# Patient Record
Sex: MALE | Race: BLACK OR AFRICAN AMERICAN | Employment: UNEMPLOYED | URBAN - METROPOLITAN AREA
[De-identification: names, ages, dates, MRNs, and addresses within clinical notes are randomized per-mention and may not be internally consistent; named-entity substitution may affect disease eponyms.]

---

## 2018-06-19 ENCOUNTER — TELEPHONE (OUTPATIENT)
Dept: INTERNAL MEDICINE CLINIC | Age: 43
End: 2018-06-19

## 2018-07-23 ENCOUNTER — OFFICE VISIT (OUTPATIENT)
Dept: RHEUMATOLOGY | Age: 43
End: 2018-07-23

## 2018-07-23 VITALS
WEIGHT: 315 LBS | BODY MASS INDEX: 40.43 KG/M2 | HEIGHT: 74 IN | DIASTOLIC BLOOD PRESSURE: 98 MMHG | SYSTOLIC BLOOD PRESSURE: 160 MMHG | HEART RATE: 87 BPM

## 2018-07-23 DIAGNOSIS — M1A.09X0 IDIOPATHIC CHRONIC GOUT OF MULTIPLE SITES WITHOUT TOPHUS: ICD-10-CM

## 2018-07-23 DIAGNOSIS — M25.50 POLYARTHRALGIA: ICD-10-CM

## 2018-07-23 DIAGNOSIS — M1A.09X0 IDIOPATHIC CHRONIC GOUT OF MULTIPLE SITES WITHOUT TOPHUS: Primary | ICD-10-CM

## 2018-07-23 LAB
A/G RATIO: 1.5 (ref 1.1–2.2)
ALBUMIN SERPL-MCNC: 4 G/DL (ref 3.4–5)
ALP BLD-CCNC: 77 U/L (ref 40–129)
ALT SERPL-CCNC: 20 U/L (ref 10–40)
ANION GAP SERPL CALCULATED.3IONS-SCNC: 15 MMOL/L (ref 3–16)
AST SERPL-CCNC: 12 U/L (ref 15–37)
ATYPICAL LYMPHOCYTE RELATIVE PERCENT: 1 % (ref 0–6)
BANDED NEUTROPHILS RELATIVE PERCENT: 1 % (ref 0–7)
BASOPHILS ABSOLUTE: 0 K/UL (ref 0–0.2)
BASOPHILS RELATIVE PERCENT: 0 %
BILIRUB SERPL-MCNC: 0.3 MG/DL (ref 0–1)
BUN BLDV-MCNC: 23 MG/DL (ref 7–20)
C-REACTIVE PROTEIN: 11 MG/L (ref 0–5.1)
CALCIUM SERPL-MCNC: 9.3 MG/DL (ref 8.3–10.6)
CHLORIDE BLD-SCNC: 109 MMOL/L (ref 99–110)
CO2: 23 MMOL/L (ref 21–32)
CREAT SERPL-MCNC: 1 MG/DL (ref 0.9–1.3)
EOSINOPHILS ABSOLUTE: 0 K/UL (ref 0–0.6)
EOSINOPHILS RELATIVE PERCENT: 0 %
GFR AFRICAN AMERICAN: >60
GFR NON-AFRICAN AMERICAN: >60
GLOBULIN: 2.6 G/DL
GLUCOSE BLD-MCNC: 129 MG/DL (ref 70–99)
HCT VFR BLD CALC: 48.6 % (ref 40.5–52.5)
HEMOGLOBIN: 15.2 G/DL (ref 13.5–17.5)
HEPATITIS B CORE IGM ANTIBODY: NORMAL
HEPATITIS B SURFACE ANTIGEN INTERPRETATION: NORMAL
HEPATITIS C ANTIBODY INTERPRETATION: NORMAL
LYMPHOCYTES ABSOLUTE: 1.9 K/UL (ref 1–5.1)
LYMPHOCYTES RELATIVE PERCENT: 10 %
MCH RBC QN AUTO: 28.8 PG (ref 26–34)
MCHC RBC AUTO-ENTMCNC: 31.3 G/DL (ref 31–36)
MCV RBC AUTO: 91.8 FL (ref 80–100)
MONOCYTES ABSOLUTE: 0.4 K/UL (ref 0–1.3)
MONOCYTES RELATIVE PERCENT: 2 %
NEUTROPHILS ABSOLUTE: 15.4 K/UL (ref 1.7–7.7)
NEUTROPHILS RELATIVE PERCENT: 86 %
PDW BLD-RTO: 16.1 % (ref 12.4–15.4)
PLATELET # BLD: 293 K/UL (ref 135–450)
PMV BLD AUTO: 9.2 FL (ref 5–10.5)
POTASSIUM SERPL-SCNC: 4.4 MMOL/L (ref 3.5–5.1)
RBC # BLD: 5.29 M/UL (ref 4.2–5.9)
RHEUMATOID FACTOR: 11 IU/ML
SEDIMENTATION RATE, ERYTHROCYTE: 10 MM/HR (ref 0–15)
SODIUM BLD-SCNC: 147 MMOL/L (ref 136–145)
TOTAL PROTEIN: 6.6 G/DL (ref 6.4–8.2)
URIC ACID, SERUM: 11.9 MG/DL (ref 3.5–7.2)
WBC # BLD: 17.7 K/UL (ref 4–11)

## 2018-07-23 PROCEDURE — 99244 OFF/OP CNSLTJ NEW/EST MOD 40: CPT | Performed by: INTERNAL MEDICINE

## 2018-07-23 PROCEDURE — G8427 DOCREV CUR MEDS BY ELIG CLIN: HCPCS | Performed by: INTERNAL MEDICINE

## 2018-07-23 PROCEDURE — G8417 CALC BMI ABV UP PARAM F/U: HCPCS | Performed by: INTERNAL MEDICINE

## 2018-07-23 RX ORDER — PREDNISONE 10 MG/1
TABLET ORAL
Qty: 20 TABLET | Refills: 1 | Status: SHIPPED | OUTPATIENT
Start: 2018-07-23 | End: 2018-08-05

## 2018-07-23 NOTE — PROGRESS NOTES
2018  Patient Name: Shay Cowan  : 1975  Medical Record: [id number]    MEDICATIONS  Current Outpatient Prescriptions   Medication Sig Dispense Refill    predniSONE (DELTASONE) 10 MG tablet Take 4  tabs daily for 2 days, then 3 tabs daily for 2 days, then 2 tabs daily for 2 days, then 1 tab daily for 2 days and then stop 20 tablet 1    hydrALAZINE (APRESOLINE) 50 MG tablet Take 50 mg by mouth 2 times daily      metoprolol (TOPROL-XL) 100 MG XL tablet Take 100 mg by mouth daily      furosemide (LASIX) 20 MG tablet Take 40 mg by mouth daily        No current facility-administered medications for this visit. ALLERGIES  No Known Allergies      Comments  No specialty comments available. REFERRING PHYSICIAN: Dimas Melendez CNP    HISTORY OF PRESENT ILLNESS  Shay Cowan is a 43 y.o. male who is being seen for follow up evaluation of Joint pain. He was diagnosed with gout 3 years ago. His gout flareups started 3 years ago involving multiple joints. He usually gets flareups once or twice a month. He describes his flareups as red, swollen, tender, painful joint associated with erythema and warmth. He has been to the ER multiple times and has received steroid injections and prednisone taper with improvement in symptoms. He was prescribed allopurinol and stayed on it for 1 year but did not notice any improvement. He continues to have joint pain in between the flareups associated with swelling and stiffness. Back and knees are worse. He has morning stiffness lasting for few hours. His noticed improvement in joint pain on steroids. He has tried multiple NSAIDs without improvement. He also has back pain involving the lower back. Back pain gets worse with activity and improves with rest.  He has stiffness in the back.   He denies radiation, tingling or numbness of lower extremity weakness.  -He denies history of psoriasis, inflammatory bowel disease, uveitis, dactylitis, education: N/A     Occupational History    Not on file.      Social History Main Topics    Smoking status: Current Some Day Smoker     Packs/day: 0.10    Smokeless tobacco: Current User      Comment: 1 pk/wk    Alcohol use Yes      Comment: occasionally    Drug use: No    Sexual activity: Yes     Other Topics Concern    Not on file     Social History Narrative    No narrative on file     Family History   Problem Relation Age of Onset    Arthritis Mother     Diabetes Mother     Heart Disease Mother     Heart Disease Father     Arthritis Sister     Heart Disease Sister     Diabetes Sister     Heart Disease Maternal Grandmother     Diabetes Maternal Grandmother     Heart Attack Maternal Grandmother     Arthritis Maternal Grandmother     Heart Disease Paternal Grandfather     Heart Attack Paternal Grandfather     Arthritis Paternal Grandfather          PHYSICAL EXAM   Vitals:    07/23/18 0907 07/23/18 0912   BP: (!) 160/108 (!) 160/98   Site: Right Arm Right Arm   Position: Sitting Sitting   Cuff Size: Medium Adult Medium Adult   Pulse:  87   Weight: (!) 503 lb (228.2 kg)    Height: 6' 2\" (1.88 m)      Physical Exam  Constitutional:  Well developed, well nourished, no acute distress, non-toxic appearance   Musculoskeletal:  RIGHT  Swell  Tender  ROM  LEFT  Swell  Tender  ROM    DIP2  0  0  FULL   0  0  FULL    DIP3  0  0  FULL   0  0  FULL    DIP4  0  0  FULL   0  0  FULL    DIP5  0  0  FULL   0  0  FULL    PIP1  0  0  FULL   0  0  FULL    PIP2  0  0  FULL   0  0  FULL    PIP3  0  0  FULL   0  0  FULL    PIP4  0  0  FULL   0  0  FULL    PIP5  0  0  FULL   0  0  FULL    MCP1  0  0  FULL   0  0  FULL    MCP2  0  0  FULL   0 + FULL    MCP3  0  0  FULL   0  0  FULL    MCP4  0  0  FULL   0  0  FULL    MCP5  0  0  FULL   0  0  FULL    Wrist  0  0  FULL   0  0  FULL    Elbow  0  0  FULL   0  0  FULL    Shouldr  0  0  FULL   0  0  FULL    Hip  0  0  FULL   0  0  FULL    Knee  0  0  Crepitus++  0  0 Crepitus++   Ankle  0  0  FULL   0  0  FULL    MTP1  0  0  FULL   0  0  FULL    MTP2  0  0  FULL   0  0  FULL    MTP3  0  0  FULL   0  0  FULL    MTP4  0  0  FULL   0  0  FULL    MTP5  0  0  FULL   0  0  FULL    IP1  0  0  FULL   0  0  FULL    IP2  0  0  FULL   0  0  FULL    IP3  0  0  FULL   0  0  FULL    IP4  0  0  FULL   0  0  FULL    IP5  0  0  FULL   0 0 FULL       Puffiness in bilateral 2 and 3 MCP joints  Ambulates without assistance, normal gait  Neck: Full ROM, no tenderness, supple   Back- no tenderness. Eyes:  PERRL, extra ocular movements intact, conjunctiva normal   HEENT:  Atraumatic, normocephalic, external ears normal, oropharynx moist, no pharyngeal exudates. Respiratory:  No respiratory distress  GI:  Soft, nondistended, normal bowel sounds, nontender, no organomegaly, no mass, no rebound, no guarding   :  No costovertebral angle tenderness   Integument:  Well hydrated, no rash or telangiectasias  Lymphatic:  No lymphadenopathy noted   Neurologic:   Alert & oriented x 3, CN 2-12 normal, no focal deficits noted. Sensations Intact. Muscle strength 5/5 proximally and distally in upper and lower extremities.    Psychiatric:  Speech and behavior appropriate   Extremities: 2+ edema up to the mid legs        LABS AND IMAGING  Outside data reviewed and in HPI    Lab Results   Component Value Date    WBC 14.9 07/18/2014    RBC 5.29 07/18/2014    HGB 15.1 07/18/2014    HCT 47.5 07/18/2014     07/18/2014    MCV 89.8 07/18/2014    MCH 28.6 07/18/2014    MCHC 31.8 07/18/2014    RDW 15.0 07/18/2014    LYMPHOPCT 22.7 07/18/2014    MONOPCT 7.9 07/18/2014    BASOPCT 0.3 07/18/2014    MONOSABS 1.2 07/18/2014    LYMPHSABS 3.4 07/18/2014    EOSABS 0.3 07/18/2014    BASOSABS 0.0 07/18/2014       Chemistry        Component Value Date/Time     07/18/2014 1830    K 3.8 07/18/2014 1830     07/18/2014 1830    CO2 27 07/18/2014 1830    BUN 11 07/18/2014 1830    CREATININE 1.0 07/18/2014 1830

## 2018-07-25 LAB — CCP IGG ANTIBODIES: 2 UNITS (ref 0–19)

## 2018-08-30 ENCOUNTER — TELEPHONE (OUTPATIENT)
Dept: RHEUMATOLOGY | Age: 43
End: 2018-08-30

## 2018-08-30 ENCOUNTER — OFFICE VISIT (OUTPATIENT)
Dept: RHEUMATOLOGY | Age: 43
End: 2018-08-30

## 2018-08-30 VITALS
HEIGHT: 73 IN | HEART RATE: 96 BPM | BODY MASS INDEX: 41.75 KG/M2 | SYSTOLIC BLOOD PRESSURE: 145 MMHG | DIASTOLIC BLOOD PRESSURE: 105 MMHG | WEIGHT: 315 LBS

## 2018-08-30 DIAGNOSIS — M1A.09X0 IDIOPATHIC CHRONIC GOUT OF MULTIPLE SITES WITHOUT TOPHUS: Primary | ICD-10-CM

## 2018-08-30 PROCEDURE — 4004F PT TOBACCO SCREEN RCVD TLK: CPT | Performed by: INTERNAL MEDICINE

## 2018-08-30 PROCEDURE — 99214 OFFICE O/P EST MOD 30 MIN: CPT | Performed by: INTERNAL MEDICINE

## 2018-08-30 PROCEDURE — G8417 CALC BMI ABV UP PARAM F/U: HCPCS | Performed by: INTERNAL MEDICINE

## 2018-08-30 PROCEDURE — G8427 DOCREV CUR MEDS BY ELIG CLIN: HCPCS | Performed by: INTERNAL MEDICINE

## 2018-08-30 RX ORDER — COLCHICINE 0.6 MG/1
0.6 TABLET ORAL 2 TIMES DAILY
Qty: 60 TABLET | Refills: 3 | Status: SHIPPED | OUTPATIENT
Start: 2018-08-30 | End: 2019-02-27 | Stop reason: SDUPTHER

## 2018-08-30 RX ORDER — FEBUXOSTAT 40 MG/1
40 TABLET, FILM COATED ORAL DAILY
Qty: 30 TABLET | Refills: 3 | Status: SHIPPED | OUTPATIENT
Start: 2018-08-30 | End: 2018-10-09 | Stop reason: SDUPTHER

## 2018-08-30 RX ORDER — PREDNISONE 10 MG/1
TABLET ORAL
Qty: 20 TABLET | Refills: 1 | Status: SHIPPED | OUTPATIENT
Start: 2018-08-30 | End: 2018-10-09 | Stop reason: SDUPTHER

## 2018-08-30 NOTE — TELEPHONE ENCOUNTER
PA submitted for ULORIC 40 MG OR TABS  And Cochicine 0.6 mg Tabs on On license of [facility]  KEY [id number] & Key: [id number]    Pending review

## 2018-08-30 NOTE — PROGRESS NOTES
2018  Patient Name: Yuliet Bethea  : 1975  Medical Record: [id number]    MEDICATIONS  Current Outpatient Prescriptions   Medication Sig Dispense Refill    febuxostat (ULORIC) 40 MG TABS tablet Take 1 tablet by mouth daily 30 tablet 3    colchicine (COLCRYS) 0.6 MG tablet Take 1 tablet by mouth 2 times daily 60 tablet 3    predniSONE (DELTASONE) 10 MG tablet Take 4  tabs daily for 2 days, then 3 tabs daily for 2 days, then 2 tabs daily for 2 days, then 1 tab daily for 2 days and then stop 20 tablet 1    hydrALAZINE (APRESOLINE) 50 MG tablet Take 50 mg by mouth 2 times daily      metoprolol (TOPROL-XL) 100 MG XL tablet Take 100 mg by mouth daily      furosemide (LASIX) 20 MG tablet Take 40 mg by mouth daily        No current facility-administered medications for this visit. ALLERGIES  No Known Allergies      Comments  No specialty comments available. Background history:  Yuliet Bethea is a 43 y.o. male who is being seen for follow up evaluation of Joint pain. He was diagnosed with gout 3 years ago. His gout flareups started 3 years ago involving multiple joints. He usually gets flareups once or twice a month. He describes his flareups as red, swollen, tender, painful joint associated with erythema and warmth. He has been to the ER multiple times and has received steroid injections and prednisone taper with improvement in symptoms. He was prescribed allopurinol and stayed on it for 1 year but did not notice any improvement. He continues to have joint pain in between the flareups associated with swelling and stiffness. Back and knees are worse. He has morning stiffness lasting for few hours. His noticed improvement in joint pain on steroids. He has tried multiple NSAIDs without improvement. He also has back pain involving the lower back. Back pain gets worse with activity and improves with rest.  He has stiffness in the back.   He denies radiation, tingling or numbness of lower osteoporosis  Allergic/Immunologic: No nasal congestion or hives. I have reviewed patients Past medical History, Social History and Family History as mentioned in her chart and this remains unchanged from previous. Past Medical History:   Diagnosis Date    Arthritis     CHF (congestive heart failure) (HCC)     Gout     HTN (hypertension)     Hypertension      No past surgical history on file. Social History     Social History    Marital status:      Spouse name: N/A    Number of children: N/A    Years of education: N/A     Occupational History    Not on file.      Social History Main Topics    Smoking status: Current Some Day Smoker     Packs/day: 0.10    Smokeless tobacco: Current User      Comment: 1 pk/wk    Alcohol use Yes      Comment: occasionally    Drug use: No    Sexual activity: Yes     Other Topics Concern    Not on file     Social History Narrative    No narrative on file     Family History   Problem Relation Age of Onset    Arthritis Mother     Diabetes Mother     Heart Disease Mother     Heart Disease Father     Arthritis Sister     Heart Disease Sister     Diabetes Sister     Heart Disease Maternal Grandmother     Diabetes Maternal Grandmother     Heart Attack Maternal Grandmother     Arthritis Maternal Grandmother     Heart Disease Paternal Grandfather     Heart Attack Paternal Grandfather     Arthritis Paternal Grandfather          PHYSICAL EXAM   Vitals:    08/30/18 1245   BP: (!) 145/105   Site: Left Arm   Position: Sitting   Cuff Size: Large Adult   Pulse: 96   Weight: (!) 500 lb (226.8 kg)   Height: 6' 1\" (1.854 m)     Physical Exam  Constitutional:  Well developed, well nourished, no acute distress, non-toxic appearance   Musculoskeletal:  RIGHT  Swell  Tender  ROM  LEFT  Swell  Tender  ROM    DIP2  0  0  FULL   0  0  FULL    DIP3  0  0  FULL   0  0  FULL    DIP4  0  0  FULL   0  0  FULL    DIP5  0  0  FULL   0  0  FULL    PIP1  0  0  FULL   0  0 FULL    PIP2  0  0  FULL   0  0  FULL    PIP3  0  0  FULL   0  0  FULL    PIP4  0  0  FULL   0  0  FULL    PIP5  0  0  FULL   0  0  FULL    MCP1  0  0  FULL   0  0  FULL    MCP2  0  0  FULL   0 + FULL    MCP3  0  0  FULL   0  0  FULL    MCP4  0  0  FULL   0  0  FULL    MCP5  0  0  FULL   0  0  FULL    Wrist  0  0  FULL   0  0  FULL    Elbow  0  0  FULL   0  0  FULL    Shouldr  0  0  FULL   0  0  FULL    Hip  0  0  FULL   0  0  FULL    Knee  0  + Crepitus++  0  0  Crepitus++   Ankle  0  0  FULL   0  0  FULL    MTP1  0  0  FULL   0  0  FULL    MTP2  0  0  FULL   0  0  FULL    MTP3  0  0  FULL   0  0  FULL    MTP4  0  0  FULL   0  0  FULL    MTP5  0  0  FULL   0  0  FULL    IP1  0  0  FULL   0  0  FULL    IP2  0  0  FULL   0  0  FULL    IP3  0  0  FULL   0  0  FULL    IP4  0  0  FULL   0  0  FULL    IP5  0  0  FULL   0 0 FULL       Puffiness in bilateral 2 and 3 MCP joints  Ambulates without assistance, normal gait  Neck: Full ROM, no tenderness, supple   Back- no tenderness. Eyes:  PERRL, extra ocular movements intact, conjunctiva normal   HEENT:  Atraumatic, normocephalic, external ears normal, oropharynx moist, no pharyngeal exudates. Respiratory:  No respiratory distress  GI:  Soft, nondistended, normal bowel sounds, nontender, no organomegaly, no mass, no rebound, no guarding   :  No costovertebral angle tenderness   Integument:  Well hydrated, no rash or telangiectasias  Lymphatic:  No lymphadenopathy noted   Neurologic:   Alert & oriented x 3, CN 2-12 normal, no focal deficits noted. Sensations Intact. Muscle strength 5/5 proximally and distally in upper and lower extremities.    Psychiatric:  Speech and behavior appropriate   Extremities: 2+ edema up to the mid legs        LABS AND IMAGING  Outside data reviewed and in HPI    Lab Results   Component Value Date    WBC 17.7 07/23/2018    RBC 5.29 07/23/2018    HGB 15.2 07/23/2018    HCT 48.6 07/23/2018     07/23/2018    MCV 91.8 07/23/2018    MCH 28.8 07/23/2018    MCHC 31.3 07/23/2018    RDW 16.1 07/23/2018    BANDSPCT 1 07/23/2018    LYMPHOPCT 10.0 07/23/2018    MONOPCT 2.0 07/23/2018    BASOPCT 0.0 07/23/2018    MONOSABS 0.4 07/23/2018    LYMPHSABS 1.9 07/23/2018    EOSABS 0.0 07/23/2018    BASOSABS 0.0 07/23/2018       Chemistry        Component Value Date/Time     (H) 07/23/2018 1006    K 4.4 07/23/2018 1006     07/23/2018 1006    CO2 23 07/23/2018 1006    BUN 23 (H) 07/23/2018 1006    CREATININE 1.0 07/23/2018 1006        Component Value Date/Time    CALCIUM 9.3 07/23/2018 1006    ALKPHOS 77 07/23/2018 1006    AST 12 (L) 07/23/2018 1006    ALT 20 07/23/2018 1006    BILITOT 0.3 07/23/2018 1006          Lab Results   Component Value Date    SEDRATE 10 07/23/2018     Lab Results   Component Value Date    CRP 11.0 (H) 07/23/2018     No results found for: TEE, TETO, SSA, SSB, C3, C4  Lab Results   Component Value Date    RF 11.0 07/23/2018    CCPABIGG 2 07/23/2018     No results found for: TEE, ANATITER, ANAINT, PATH  No results found for: DSDNAG, DSDNAIGGIFA  No results found for: SSAROAB, SSALAAB  No results found for: SMAB, RNPAB  No results found for: CENTABIGG  No results found for: C3, C4, ACE  No results found for: JO1, VITD25, [id number]  Lab Results   Component Value Date    LABURIC 11.9 (H) 07/23/2018       ASSESSMENT AND PLAN      Assessment/Plan:      ASSESSMENT:    1. Idiopathic chronic gout of multiple sites without tophus        PLAN:   1. Idiopathic chronic gout of multiple sites without tophus  The nature of gout is fully explained, including dietary relationship, acute and interval phase and treatment of both. Long term complications such as kidney stones, tophi and arthritis are discussed. Avoidance of alcohol recommended, and written literature is given along with a low purine diet. Indications for the use of allopurinol for prophylaxis and the use of colchicine to prevent or treat flare-ups is also discussed.  Proper use of indomethacin for acute attacks discussed, and its side effects. Call if further attacks occur, or this one does not resolve promptly.  -Last uric acid 11.9 on 7/23/2018  -No improvement with allopurinol  -I will start uloric 40 mg daily and colchicine 0.6 m twice a day  -Prednisone taper for flareups  -Purine restricted diet  - febuxostat (ULORIC) 40 MG TABS tablet; Take 1 tablet by mouth daily  Dispense: 30 tablet; Refill: 3  - colchicine (COLCRYS) 0.6 MG tablet; Take 1 tablet by mouth 2 times daily  Dispense: 60 tablet; Refill: 3  - predniSONE (DELTASONE) 10 MG tablet; Take 4  tabs daily for 2 days, then 3 tabs daily for 2 days, then 2 tabs daily for 2 days, then 1 tab daily for 2 days and then stop  Dispense: 20 tablet; Refill: 1     The patient indicates understanding of these issues and agrees with the plan. Return in about 6 weeks (around 10/11/2018). The risks and benefits of my recommendations, as well as other treatment options, benefits and side effects were discussed with the patient. All questions were answered. ######################################################################    I thank you for giving me the opportunity to participate in [facility]. If you have any questions or concerns please feel free to contact me. I look forward to following  Liliana Walton along with you. Electronically signed by: Kathie Foster MD, 8/30/2018 1:05 PM    Documentation was done using voice recognition dragon software. Every effort was made to ensure accuracy; however, inadvertent unintentional computerized transcription errors may be present.

## 2018-08-31 NOTE — TELEPHONE ENCOUNTER
[employer] closed this PA so I had to renew it. New KEY [id number]    Check back for additional questions.

## 2018-10-09 ENCOUNTER — HOSPITAL ENCOUNTER (OUTPATIENT)
Age: 43
Discharge: HOME | End: 2018-10-09
Payer: MEDICAID

## 2018-10-09 ENCOUNTER — HOSPITAL ENCOUNTER (EMERGENCY)
Age: 43
Discharge: HOME | End: 2018-10-09
Payer: MEDICAID

## 2018-10-09 ENCOUNTER — HOSPITAL ENCOUNTER (OUTPATIENT)
Dept: GENERAL RADIOLOGY | Age: 43
Discharge: HOME | End: 2018-10-09
Payer: MEDICAID

## 2018-10-09 ENCOUNTER — OFFICE VISIT (OUTPATIENT)
Dept: RHEUMATOLOGY | Age: 43
End: 2018-10-09
Payer: MEDICAID

## 2018-10-09 VITALS
TEMPERATURE: 98 F | HEART RATE: 96 BPM | HEIGHT: 73 IN | DIASTOLIC BLOOD PRESSURE: 98 MMHG | WEIGHT: 315 LBS | SYSTOLIC BLOOD PRESSURE: 147 MMHG | BODY MASS INDEX: 41.75 KG/M2

## 2018-10-09 VITALS
OXYGEN SATURATION: 94 % | DIASTOLIC BLOOD PRESSURE: 87 MMHG | HEIGHT: 73 IN | RESPIRATION RATE: 20 BRPM | TEMPERATURE: 97 F | HEART RATE: 90 BPM | SYSTOLIC BLOOD PRESSURE: 118 MMHG | BODY MASS INDEX: 41.75 KG/M2 | WEIGHT: 315 LBS

## 2018-10-09 DIAGNOSIS — M1A.09X0 IDIOPATHIC CHRONIC GOUT OF MULTIPLE SITES WITHOUT TOPHUS: ICD-10-CM

## 2018-10-09 DIAGNOSIS — R74.8 ELEVATED LIVER ENZYMES: Primary | ICD-10-CM

## 2018-10-09 DIAGNOSIS — H10.13 ALLERGIC CONJUNCTIVITIS OF BOTH EYES: Primary | ICD-10-CM

## 2018-10-09 DIAGNOSIS — M17.0 PRIMARY OSTEOARTHRITIS OF BOTH KNEES: Primary | ICD-10-CM

## 2018-10-09 DIAGNOSIS — M17.0 PRIMARY OSTEOARTHRITIS OF BOTH KNEES: ICD-10-CM

## 2018-10-09 LAB
A/G RATIO: 1.7 (ref 1.1–2.2)
ALBUMIN SERPL-MCNC: 4.3 G/DL (ref 3.4–5)
ALP BLD-CCNC: 72 U/L (ref 40–129)
ALT SERPL-CCNC: 50 U/L (ref 10–40)
ANION GAP SERPL CALCULATED.3IONS-SCNC: 14 MMOL/L (ref 3–16)
AST SERPL-CCNC: 40 U/L (ref 15–37)
BASOPHILS ABSOLUTE: 0 K/UL (ref 0–0.2)
BASOPHILS RELATIVE PERCENT: 0.3 %
BILIRUB SERPL-MCNC: 0.5 MG/DL (ref 0–1)
BUN BLDV-MCNC: 11 MG/DL (ref 7–20)
CALCIUM SERPL-MCNC: 10 MG/DL (ref 8.3–10.6)
CHLORIDE BLD-SCNC: 106 MMOL/L (ref 99–110)
CO2: 25 MMOL/L (ref 21–32)
CREAT SERPL-MCNC: 1.1 MG/DL (ref 0.9–1.3)
EOSINOPHILS ABSOLUTE: 0.2 K/UL (ref 0–0.6)
EOSINOPHILS RELATIVE PERCENT: 2 %
GFR AFRICAN AMERICAN: >60
GFR NON-AFRICAN AMERICAN: >60
GLOBULIN: 2.5 G/DL
GLUCOSE BLD-MCNC: 108 MG/DL (ref 70–99)
HCT VFR BLD CALC: 54.3 % (ref 40.5–52.5)
HEMOGLOBIN: 17.2 G/DL (ref 13.5–17.5)
LYMPHOCYTES ABSOLUTE: 2.5 K/UL (ref 1–5.1)
LYMPHOCYTES RELATIVE PERCENT: 21.6 %
MCH RBC QN AUTO: 29.1 PG (ref 26–34)
MCHC RBC AUTO-ENTMCNC: 31.6 G/DL (ref 31–36)
MCV RBC AUTO: 92.1 FL (ref 80–100)
MONOCYTES ABSOLUTE: 1.2 K/UL (ref 0–1.3)
MONOCYTES RELATIVE PERCENT: 10.9 %
NEUTROPHILS ABSOLUTE: 7.5 K/UL (ref 1.7–7.7)
NEUTROPHILS RELATIVE PERCENT: 65.2 %
PDW BLD-RTO: 16.4 % (ref 12.4–15.4)
PLATELET # BLD: 252 K/UL (ref 135–450)
PMV BLD AUTO: 10.5 FL (ref 5–10.5)
POTASSIUM SERPL-SCNC: 4 MMOL/L (ref 3.5–5.1)
RBC # BLD: 5.89 M/UL (ref 4.2–5.9)
SODIUM BLD-SCNC: 145 MMOL/L (ref 136–145)
TOTAL PROTEIN: 6.8 G/DL (ref 6.4–8.2)
URIC ACID, SERUM: 9.2 MG/DL (ref 3.5–7.2)
WBC # BLD: 11.4 K/UL (ref 4–11)

## 2018-10-09 PROCEDURE — 99214 OFFICE O/P EST MOD 30 MIN: CPT | Performed by: INTERNAL MEDICINE

## 2018-10-09 PROCEDURE — G8427 DOCREV CUR MEDS BY ELIG CLIN: HCPCS | Performed by: INTERNAL MEDICINE

## 2018-10-09 PROCEDURE — G8417 CALC BMI ABV UP PARAM F/U: HCPCS | Performed by: INTERNAL MEDICINE

## 2018-10-09 PROCEDURE — 73560 X-RAY EXAM OF KNEE 1 OR 2: CPT

## 2018-10-09 PROCEDURE — G8484 FLU IMMUNIZE NO ADMIN: HCPCS | Performed by: INTERNAL MEDICINE

## 2018-10-09 PROCEDURE — 20610 DRAIN/INJ JOINT/BURSA W/O US: CPT | Performed by: INTERNAL MEDICINE

## 2018-10-09 PROCEDURE — 4004F PT TOBACCO SCREEN RCVD TLK: CPT | Performed by: INTERNAL MEDICINE

## 2018-10-09 PROCEDURE — 99282 EMERGENCY DEPT VISIT SF MDM: CPT

## 2018-10-09 RX ORDER — TRIAMCINOLONE ACETONIDE 40 MG/ML
80 INJECTION, SUSPENSION INTRA-ARTICULAR; INTRAMUSCULAR ONCE
Status: COMPLETED | OUTPATIENT
Start: 2018-10-09 | End: 2018-10-09

## 2018-10-09 RX ORDER — NAPROXEN 500 MG/1
500 TABLET ORAL 2 TIMES DAILY WITH MEALS
Qty: 60 TABLET | Refills: 3 | Status: SHIPPED | OUTPATIENT
Start: 2018-10-09 | End: 2019-02-27 | Stop reason: SDUPTHER

## 2018-10-09 RX ORDER — LIDOCAINE HYDROCHLORIDE 10 MG/ML
2 INJECTION, SOLUTION INFILTRATION; PERINEURAL ONCE
Status: COMPLETED | OUTPATIENT
Start: 2018-10-09 | End: 2018-10-09

## 2018-10-09 RX ORDER — PREDNISONE 10 MG/1
TABLET ORAL
Qty: 20 TABLET | Refills: 0 | Status: SHIPPED | OUTPATIENT
Start: 2018-10-09 | End: 2019-01-31 | Stop reason: SDUPTHER

## 2018-10-09 RX ORDER — TRIAMCINOLONE ACETONIDE 40 MG/ML
40 INJECTION, SUSPENSION INTRA-ARTICULAR; INTRAMUSCULAR ONCE
Status: CANCELLED | OUTPATIENT
Start: 2018-10-09 | End: 2018-10-09

## 2018-10-09 RX ORDER — OLOPATADINE HYDROCHLORIDE 1 MG/ML
1 SOLUTION/ DROPS OPHTHALMIC 2 TIMES DAILY
Qty: 1 BOTTLE | Refills: 0 | Status: SHIPPED | OUTPATIENT
Start: 2018-10-09 | End: 2018-10-14

## 2018-10-09 RX ORDER — FEBUXOSTAT 80 MG/1
80 TABLET, FILM COATED ORAL DAILY
Qty: 30 TABLET | Refills: 5 | Status: SHIPPED | OUTPATIENT
Start: 2018-10-09 | End: 2019-02-04 | Stop reason: SDUPTHER

## 2018-10-09 RX ADMIN — LIDOCAINE HYDROCHLORIDE 2 ML: 10 INJECTION, SOLUTION INFILTRATION; PERINEURAL at 17:23

## 2018-10-09 RX ADMIN — TRIAMCINOLONE ACETONIDE 80 MG: 40 INJECTION, SUSPENSION INTRA-ARTICULAR; INTRAMUSCULAR at 17:24

## 2018-10-09 RX ADMIN — LIDOCAINE HYDROCHLORIDE 2 ML: 10 INJECTION, SOLUTION INFILTRATION; PERINEURAL at 17:22

## 2018-10-09 ASSESSMENT — ENCOUNTER SYMPTOMS
VOMITING: 0
SHORTNESS OF BREATH: 0
DIARRHEA: 0
EYE REDNESS: 1
EYE ITCHING: 1
EYE DISCHARGE: 1
NAUSEA: 0
ABDOMINAL PAIN: 0
CHEST TIGHTNESS: 0

## 2018-10-09 NOTE — PROGRESS NOTES
10/9/2018  Patient Name: Krunal Tripathi  : 1975  Medical Record: [id number]    MEDICATIONS  Current Outpatient Prescriptions   Medication Sig Dispense Refill    Febuxostat 80 MG TABS Take 80 mg by mouth daily 30 tablet 5    predniSONE (DELTASONE) 10 MG tablet Take 4  tabs daily for 2 days, then 3 tabs daily for 2 days, then 2 tabs daily for 2 days, then 1 tab daily for 2 days and then stop 20 tablet 0    naproxen (NAPROSYN) 500 MG tablet Take 1 tablet by mouth 2 times daily (with meals) 60 tablet 3    colchicine (COLCRYS) 0.6 MG tablet Take 1 tablet by mouth 2 times daily 60 tablet 3    hydrALAZINE (APRESOLINE) 50 MG tablet Take 50 mg by mouth 2 times daily      metoprolol (TOPROL-XL) 100 MG XL tablet Take 100 mg by mouth daily      furosemide (LASIX) 20 MG tablet Take 40 mg by mouth daily        No current facility-administered medications for this visit. ALLERGIES  No Known Allergies      Comments  No specialty comments available. Background history:  Krunal Tripathi is a 43 y.o. male who is being seen for follow up evaluation of Joint pain. He was diagnosed with gout 3 years ago. His gout flareups started 3 years ago involving multiple joints. He usually gets flareups once or twice a month. He describes his flareups as red, swollen, tender, painful joint associated with erythema and warmth. He has been to the ER multiple times and has received steroid injections and prednisone taper with improvement in symptoms. He was prescribed allopurinol and stayed on it for 1 year but did not notice any improvement. He continues to have joint pain in between the flareups associated with swelling and stiffness. Back and knees are worse. He has morning stiffness lasting for few hours. His noticed improvement in joint pain on steroids. He has tried multiple NSAIDs without improvement. He also has back pain involving the lower back.   Back pain gets worse with activity and improves with rest.  He Panel; Future  - Uric Acid; Future    2. Primary osteoarthritis of both knees  ***  - XR KNEE RIGHT (3 VIEWS); Future  - XR KNEE LEFT (3 VIEWS); Future  - naproxen (NAPROSYN) 500 MG tablet; Take 1 tablet by mouth 2 times daily (with meals)  Dispense: 60 tablet; Refill: 3    1. Idiopathic chronic gout of multiple sites without tophus  The nature of gout is fully explained, including dietary relationship, acute and interval phase and treatment of both. Long term complications such as kidney stones, tophi and arthritis are discussed. Avoidance of alcohol recommended, and written literature is given along with a low purine diet. Indications for the use of allopurinol for prophylaxis and the use of colchicine to prevent or treat flare-ups is also discussed. Proper use of indomethacin for acute attacks discussed, and its side effects. Call if further attacks occur, or this one does not resolve promptly.  -Last uric acid 11.9 on 7/23/2018  -No improvement with allopurinol  -I will start uloric 40 mg daily and colchicine 0.6 m twice a day  -Prednisone taper for flareups  -Purine restricted diet  - febuxostat (ULORIC) 40 MG TABS tablet; Take 1 tablet by mouth daily  Dispense: 30 tablet; Refill: 3  - colchicine (COLCRYS) 0.6 MG tablet; Take 1 tablet by mouth 2 times daily  Dispense: 60 tablet; Refill: 3  - predniSONE (DELTASONE) 10 MG tablet; Take 4  tabs daily for 2 days, then 3 tabs daily for 2 days, then 2 tabs daily for 2 days, then 1 tab daily for 2 days and then stop  Dispense: 20 tablet; Refill: 1     The patient indicates understanding of these issues and agrees with the plan. No Follow-up on file. The risks and benefits of my recommendations, as well as other treatment options, benefits and side effects were discussed with the patient. All questions were answered.      ######################################################################    I thank you for giving me the opportunity to participate in [facility]

## 2018-10-09 NOTE — PROGRESS NOTES
10/9/2018  Patient Name: Magali Ohara  : 1975  Medical Record: [id number]    MEDICATIONS  Current Outpatient Prescriptions   Medication Sig Dispense Refill    Febuxostat 80 MG TABS Take 80 mg by mouth daily 30 tablet 5    predniSONE (DELTASONE) 10 MG tablet Take 4  tabs daily for 2 days, then 3 tabs daily for 2 days, then 2 tabs daily for 2 days, then 1 tab daily for 2 days and then stop 20 tablet 0    naproxen (NAPROSYN) 500 MG tablet Take 1 tablet by mouth 2 times daily (with meals) 60 tablet 3    colchicine (COLCRYS) 0.6 MG tablet Take 1 tablet by mouth 2 times daily 60 tablet 3    hydrALAZINE (APRESOLINE) 50 MG tablet Take 50 mg by mouth 2 times daily      metoprolol (TOPROL-XL) 100 MG XL tablet Take 100 mg by mouth daily      furosemide (LASIX) 20 MG tablet Take 40 mg by mouth daily       olopatadine (PATANOL) 0.1 % ophthalmic solution Place 1 drop into both eyes 2 times daily for 5 days 1 Bottle 0     Current Facility-Administered Medications   Medication Dose Route Frequency Provider Last Rate Last Dose    triamcinolone acetonide (KENALOG-40) injection 80 mg  80 mg Intra-articular Once Charlotte Moore MD        lidocaine 1 % injection 2 mL  2 mL Intra-articular Once Charlotte Moore MD        lidocaine 1 % injection 2 mL  2 mL Intra-articular Once Charlotte Moore MD        triamcinolone acetonide (KENALOG-40) injection 80 mg  80 mg Intra-articular Once Charlotte Moore MD           ALLERGIES  No Known Allergies      Comments  No specialty comments available. Background history:  Magali Ohara is a 43 y.o. male who is being seen for follow up evaluation of Joint pain. He was diagnosed with gout 3 years ago. His gout flareups started 3 years ago involving multiple joints. He usually gets flareups once or twice a month. He describes his flareups as red, swollen, tender, painful joint associated with erythema and warmth.   He has been to the ER multiple times and has received steroid injections and Grandfather     Arthritis Paternal Grandfather          PHYSICAL EXAM   Vitals:    10/09/18 1054   BP: (!) 147/98   Site: Left Lower Arm   Position: Sitting   Pulse: 96   Temp: 98 °F (36.7 °C)   Weight: (!) 503 lb (228.2 kg)   Height: 6' 1\" (1.854 m)     Physical Exam  Constitutional:  Well developed, well nourished, no acute distress, non-toxic appearance   Musculoskeletal:  RIGHT  Swell  Tender  ROM  LEFT  Swell  Tender  ROM    DIP2  0  0  FULL   0  0  FULL    DIP3  0  0  FULL   0  0  FULL    DIP4  0  0  FULL   0  0  FULL    DIP5  0  0  FULL   0  0  FULL    PIP1  0  0  FULL   0  0  FULL    PIP2  0  0  FULL   0  0  FULL    PIP3  0  0  FULL   0  0  FULL    PIP4  0  0  FULL   0  0  FULL    PIP5  0  0  FULL   0  0  FULL    MCP1  0  0  FULL   0  0  FULL    MCP2  0  0  FULL   0 + FULL    MCP3  0  0  FULL   0  0  FULL    MCP4  0  0  FULL   0  0  FULL    MCP5  0  0  FULL   0  0  FULL    Wrist  0  0  FULL   0  0  FULL    Elbow  0  0  FULL   0  0  FULL    Shouldr  0  0  FULL   0  0  FULL    Hip  0  0  FULL   0  0  FULL    Knee  ++ + Crepitus++  ++ ++ Crepitus++   Ankle  0  0  FULL   0  0  FULL    MTP1  0  0  FULL   0  0  FULL    MTP2  0  0  FULL   0  0  FULL    MTP3  0  0  FULL   0  0  FULL    MTP4  0  0  FULL   0  0  FULL    MTP5  0  0  FULL   0  0  FULL    IP1  0  0  FULL   0  0  FULL    IP2  0  0  FULL   0  0  FULL    IP3  0  0  FULL   0  0  FULL    IP4  0  0  FULL   0  0  FULL    IP5  0  0  FULL   0 0 FULL     Ambulates without assistance, normal gait  Neck: Full ROM, no tenderness, supple   Back- no tenderness. Eyes:  PERRL, extra ocular movements intact, conjunctiva normal   HEENT:  Atraumatic, normocephalic, external ears normal, oropharynx moist, no pharyngeal exudates.    Respiratory:  No respiratory distress  GI:  Soft, nondistended, normal bowel sounds, nontender, no organomegaly, no mass, no rebound, no guarding   :  No costovertebral angle tenderness   Integument:  Well hydrated, no rash or chlorhexidine was used as local anesthetic. The joint was entered and Kenalog 80 mg and 2 ml plain Lidocaine was then injected and the needle withdrawn. The procedure was well tolerated. No immediate complication noticed. The patient is asked to continue to rest the joint for a few more days before resuming regular activities. Advised patient to watch for fever, increased swelling or persistent pain in the joint. Call or return to clinic prn if such symptoms occur or there is failure to improve as anticipated. The patient indicates understanding of these issues and agrees with the plan. Return in about 3 months (around 1/9/2019). The risks and benefits of my recommendations, as well as other treatment options, benefits and side effects were discussed with the patient. All questions were answered. ######################################################################    I thank you for giving me the opportunity to participate in [facility]. If you have any questions or concerns please feel free to contact me. I look forward to following  Ash Borrego along with you. Electronically signed by: Anatoliy Harding MD, 10/9/2018 3:27 PM    Documentation was done using voice recognition dragon software. Every effort was made to ensure accuracy; however, inadvertent unintentional computerized transcription errors may be present.

## 2018-10-09 NOTE — PATIENT INSTRUCTIONS
Increase uloric to 80 mg daily   Start naproxen 500 mg twice a day   Labs   xrays   Prednisone for flare ups

## 2018-10-09 NOTE — ED NOTES
Reviewed written discharge instructions with patient, answered his questions regarding ordered outpatient xrays. Patient denied further questions and verbalized understanding. Patient transported to outpatient registration by nurse via wheelchair.      Ngozi Colorado RN  10/09/18 5428

## 2018-10-10 NOTE — PROGRESS NOTES
Please call the patient and let him know that he continues to have persistently elevated uric acid. He also has elevated liver enzymes. I will repeat his liver enzymes in a week and if persistently elevated will evaluate further.   He should avoid Tylenol and alcohol in the meantime

## 2018-10-11 ENCOUNTER — TELEPHONE (OUTPATIENT)
Dept: RHEUMATOLOGY | Age: 43
End: 2018-10-11

## 2018-12-17 DIAGNOSIS — M1A.09X0 IDIOPATHIC CHRONIC GOUT OF MULTIPLE SITES WITHOUT TOPHUS: ICD-10-CM

## 2018-12-17 RX ORDER — FEBUXOSTAT 40 MG/1
TABLET ORAL
Qty: 30 TABLET | Refills: 3 | Status: SHIPPED | OUTPATIENT
Start: 2018-12-17 | End: 2019-02-04

## 2019-01-16 ENCOUNTER — TELEPHONE (OUTPATIENT)
Dept: INTERNAL MEDICINE CLINIC | Age: 44
End: 2019-01-16

## 2019-01-17 ENCOUNTER — TELEPHONE (OUTPATIENT)
Dept: INTERNAL MEDICINE CLINIC | Age: 44
End: 2019-01-17

## 2019-01-18 ENCOUNTER — TELEPHONE (OUTPATIENT)
Dept: INTERNAL MEDICINE CLINIC | Age: 44
End: 2019-01-18

## 2019-01-31 DIAGNOSIS — M1A.09X0 IDIOPATHIC CHRONIC GOUT OF MULTIPLE SITES WITHOUT TOPHUS: ICD-10-CM

## 2019-02-01 ENCOUNTER — TELEPHONE (OUTPATIENT)
Dept: INTERNAL MEDICINE CLINIC | Age: 44
End: 2019-02-01

## 2019-02-01 RX ORDER — PREDNISONE 10 MG/1
TABLET ORAL
Qty: 20 TABLET | Refills: 0 | Status: SHIPPED | OUTPATIENT
Start: 2019-02-01 | End: 2019-02-04

## 2019-02-04 ENCOUNTER — OFFICE VISIT (OUTPATIENT)
Dept: RHEUMATOLOGY | Age: 44
End: 2019-02-04
Payer: MEDICAID

## 2019-02-04 VITALS — HEART RATE: 87 BPM | DIASTOLIC BLOOD PRESSURE: 82 MMHG | SYSTOLIC BLOOD PRESSURE: 149 MMHG

## 2019-02-04 DIAGNOSIS — M1A.09X0 IDIOPATHIC CHRONIC GOUT OF MULTIPLE SITES WITHOUT TOPHUS: ICD-10-CM

## 2019-02-04 DIAGNOSIS — M1A.09X0 IDIOPATHIC CHRONIC GOUT OF MULTIPLE SITES WITHOUT TOPHUS: Primary | ICD-10-CM

## 2019-02-04 DIAGNOSIS — M79.89 LEFT LEG SWELLING: ICD-10-CM

## 2019-02-04 DIAGNOSIS — M17.0 PRIMARY OSTEOARTHRITIS OF BOTH KNEES: ICD-10-CM

## 2019-02-04 DIAGNOSIS — Z02.71 DISABILITY EXAMINATION: ICD-10-CM

## 2019-02-04 LAB
ATYPICAL LYMPHOCYTE RELATIVE PERCENT: 2 % (ref 0–6)
BANDED NEUTROPHILS RELATIVE PERCENT: 7 % (ref 0–7)
BASOPHILS ABSOLUTE: 0 K/UL (ref 0–0.2)
BASOPHILS RELATIVE PERCENT: 0 %
EOSINOPHILS ABSOLUTE: 0 K/UL (ref 0–0.6)
EOSINOPHILS RELATIVE PERCENT: 0 %
HCT VFR BLD CALC: 48 % (ref 40.5–52.5)
HEMOGLOBIN: 15.3 G/DL (ref 13.5–17.5)
LYMPHOCYTES ABSOLUTE: 2.6 K/UL (ref 1–5.1)
LYMPHOCYTES RELATIVE PERCENT: 12 %
MCH RBC QN AUTO: 28.6 PG (ref 26–34)
MCHC RBC AUTO-ENTMCNC: 32 G/DL (ref 31–36)
MCV RBC AUTO: 89.6 FL (ref 80–100)
MONOCYTES ABSOLUTE: 0.7 K/UL (ref 0–1.3)
MONOCYTES RELATIVE PERCENT: 4 %
NEUTROPHILS ABSOLUTE: 15 K/UL (ref 1.7–7.7)
NEUTROPHILS RELATIVE PERCENT: 75 %
PDW BLD-RTO: 15.3 % (ref 12.4–15.4)
PLATELET # BLD: 343 K/UL (ref 135–450)
PMV BLD AUTO: 10 FL (ref 5–10.5)
RBC # BLD: 5.36 M/UL (ref 4.2–5.9)
RBC # BLD: NORMAL 10*6/UL
VACUOLATED NEUTROPHILS: PRESENT
WBC # BLD: 18.3 K/UL (ref 4–11)

## 2019-02-04 PROCEDURE — 99214 OFFICE O/P EST MOD 30 MIN: CPT | Performed by: INTERNAL MEDICINE

## 2019-02-04 PROCEDURE — G8427 DOCREV CUR MEDS BY ELIG CLIN: HCPCS | Performed by: INTERNAL MEDICINE

## 2019-02-04 PROCEDURE — 20610 DRAIN/INJ JOINT/BURSA W/O US: CPT | Performed by: INTERNAL MEDICINE

## 2019-02-04 PROCEDURE — G8484 FLU IMMUNIZE NO ADMIN: HCPCS | Performed by: INTERNAL MEDICINE

## 2019-02-04 PROCEDURE — 4004F PT TOBACCO SCREEN RCVD TLK: CPT | Performed by: INTERNAL MEDICINE

## 2019-02-04 PROCEDURE — G8417 CALC BMI ABV UP PARAM F/U: HCPCS | Performed by: INTERNAL MEDICINE

## 2019-02-04 RX ORDER — FEBUXOSTAT 80 MG/1
80 TABLET, FILM COATED ORAL DAILY
Qty: 30 TABLET | Refills: 5 | Status: SHIPPED | OUTPATIENT
Start: 2019-02-04 | End: 2019-06-13

## 2019-02-04 RX ORDER — PREDNISONE 10 MG/1
TABLET ORAL
Qty: 40 TABLET | Refills: 0 | Status: SHIPPED | OUTPATIENT
Start: 2019-02-04 | End: 2019-06-13 | Stop reason: ALTCHOICE

## 2019-02-05 DIAGNOSIS — M1A.09X0 IDIOPATHIC CHRONIC GOUT OF MULTIPLE SITES WITHOUT TOPHUS: Primary | ICD-10-CM

## 2019-02-05 LAB
A/G RATIO: 1.2 (ref 1.1–2.2)
ALBUMIN SERPL-MCNC: 3.4 G/DL (ref 3.4–5)
ALP BLD-CCNC: 88 U/L (ref 40–129)
ALT SERPL-CCNC: 45 U/L (ref 10–40)
ANION GAP SERPL CALCULATED.3IONS-SCNC: 15 MMOL/L (ref 3–16)
AST SERPL-CCNC: 31 U/L (ref 15–37)
BILIRUB SERPL-MCNC: 0.5 MG/DL (ref 0–1)
BUN BLDV-MCNC: 28 MG/DL (ref 7–20)
C-REACTIVE PROTEIN: 67.5 MG/L (ref 0–5.1)
CALCIUM SERPL-MCNC: 8.4 MG/DL (ref 8.3–10.6)
CHLORIDE BLD-SCNC: 104 MMOL/L (ref 99–110)
CO2: 24 MMOL/L (ref 21–32)
CREAT SERPL-MCNC: 1.1 MG/DL (ref 0.9–1.3)
GFR AFRICAN AMERICAN: >60
GFR NON-AFRICAN AMERICAN: >60
GLOBULIN: 2.9 G/DL
GLUCOSE BLD-MCNC: 136 MG/DL (ref 70–99)
POTASSIUM SERPL-SCNC: 4.2 MMOL/L (ref 3.5–5.1)
SEDIMENTATION RATE, ERYTHROCYTE: 37 MM/HR (ref 0–15)
SODIUM BLD-SCNC: 143 MMOL/L (ref 136–145)
TOTAL PROTEIN: 6.3 G/DL (ref 6.4–8.2)
URIC ACID, SERUM: 9.8 MG/DL (ref 3.5–7.2)

## 2019-02-06 ENCOUNTER — TELEPHONE (OUTPATIENT)
Dept: RHEUMATOLOGY | Age: 44
End: 2019-02-06

## 2019-02-17 DIAGNOSIS — M1A.09X0 IDIOPATHIC CHRONIC GOUT OF MULTIPLE SITES WITHOUT TOPHUS: ICD-10-CM

## 2019-02-18 RX ORDER — PREDNISONE 10 MG/1
TABLET ORAL
Qty: 40 TABLET | Refills: 0 | OUTPATIENT
Start: 2019-02-18

## 2019-02-27 DIAGNOSIS — M1A.09X0 IDIOPATHIC CHRONIC GOUT OF MULTIPLE SITES WITHOUT TOPHUS: ICD-10-CM

## 2019-02-27 DIAGNOSIS — M17.0 PRIMARY OSTEOARTHRITIS OF BOTH KNEES: ICD-10-CM

## 2019-02-27 RX ORDER — NAPROXEN 500 MG/1
TABLET ORAL
Qty: 60 TABLET | Refills: 3 | Status: SHIPPED | OUTPATIENT
Start: 2019-02-27

## 2019-02-27 RX ORDER — COLCHICINE 0.6 MG/1
TABLET ORAL
Qty: 60 TABLET | Refills: 3 | Status: SHIPPED | OUTPATIENT
Start: 2019-02-27

## 2019-06-06 ENCOUNTER — APPOINTMENT (OUTPATIENT)
Dept: GENERAL RADIOLOGY | Age: 44
End: 2019-06-06
Payer: MEDICAID

## 2019-06-06 ENCOUNTER — HOSPITAL ENCOUNTER (EMERGENCY)
Age: 44
Discharge: HOME | End: 2019-06-06
Attending: EMERGENCY MEDICINE
Payer: MEDICAID

## 2019-06-06 VITALS
SYSTOLIC BLOOD PRESSURE: 140 MMHG | DIASTOLIC BLOOD PRESSURE: 98 MMHG | OXYGEN SATURATION: 96 % | RESPIRATION RATE: 16 BRPM | TEMPERATURE: 97.7 F | HEART RATE: 86 BPM | BODY MASS INDEX: 54.51 KG/M2 | WEIGHT: 315 LBS

## 2019-06-06 DIAGNOSIS — M79.89 SWELLING OF RIGHT HAND: Primary | ICD-10-CM

## 2019-06-06 DIAGNOSIS — M10.9 ARTHRITIS, GOUTY: ICD-10-CM

## 2019-06-06 LAB
ANION GAP SERPL CALCULATED.3IONS-SCNC: 12 MMOL/L (ref 3–16)
BASOPHILS ABSOLUTE: 0.1 K/UL (ref 0–0.2)
BASOPHILS RELATIVE PERCENT: 0.5 %
BUN BLDV-MCNC: 16 MG/DL (ref 7–20)
CALCIUM SERPL-MCNC: 8.9 MG/DL (ref 8.3–10.6)
CHLORIDE BLD-SCNC: 108 MMOL/L (ref 99–110)
CO2: 22 MMOL/L (ref 21–32)
CREAT SERPL-MCNC: 0.9 MG/DL (ref 0.9–1.3)
EOSINOPHILS ABSOLUTE: 0 K/UL (ref 0–0.6)
EOSINOPHILS RELATIVE PERCENT: 0.2 %
GFR AFRICAN AMERICAN: >60
GFR NON-AFRICAN AMERICAN: >60
GLUCOSE BLD-MCNC: 137 MG/DL (ref 70–99)
HCT VFR BLD CALC: 47 % (ref 40.5–52.5)
HEMOGLOBIN: 15.2 G/DL (ref 13.5–17.5)
LACTIC ACID: 1.4 MMOL/L (ref 0.4–2)
LYMPHOCYTES ABSOLUTE: 1.3 K/UL (ref 1–5.1)
LYMPHOCYTES RELATIVE PERCENT: 7.1 %
MCH RBC QN AUTO: 29.1 PG (ref 26–34)
MCHC RBC AUTO-ENTMCNC: 32.4 G/DL (ref 31–36)
MCV RBC AUTO: 90.1 FL (ref 80–100)
MONOCYTES ABSOLUTE: 1.1 K/UL (ref 0–1.3)
MONOCYTES RELATIVE PERCENT: 5.7 %
NEUTROPHILS ABSOLUTE: 16.5 K/UL (ref 1.7–7.7)
NEUTROPHILS RELATIVE PERCENT: 86.5 %
PDW BLD-RTO: 15.4 % (ref 12.4–15.4)
PLATELET # BLD: 233 K/UL (ref 135–450)
PMV BLD AUTO: 9.1 FL (ref 5–10.5)
POTASSIUM REFLEX MAGNESIUM: 3.8 MMOL/L (ref 3.5–5.1)
RBC # BLD: 5.22 M/UL (ref 4.2–5.9)
SODIUM BLD-SCNC: 142 MMOL/L (ref 136–145)
URIC ACID, SERUM: 7.9 MG/DL (ref 3.5–7.2)
WBC # BLD: 19.1 K/UL (ref 4–11)

## 2019-06-06 PROCEDURE — 6370000000 HC RX 637 (ALT 250 FOR IP): Performed by: PHYSICIAN ASSISTANT

## 2019-06-06 PROCEDURE — 83605 ASSAY OF LACTIC ACID: CPT

## 2019-06-06 PROCEDURE — 80048 BASIC METABOLIC PNL TOTAL CA: CPT

## 2019-06-06 PROCEDURE — 71046 X-RAY EXAM CHEST 2 VIEWS: CPT

## 2019-06-06 PROCEDURE — 73130 X-RAY EXAM OF HAND: CPT

## 2019-06-06 PROCEDURE — 99283 EMERGENCY DEPT VISIT LOW MDM: CPT

## 2019-06-06 PROCEDURE — 85025 COMPLETE CBC W/AUTO DIFF WBC: CPT

## 2019-06-06 PROCEDURE — 84550 ASSAY OF BLOOD/URIC ACID: CPT

## 2019-06-06 PROCEDURE — 73110 X-RAY EXAM OF WRIST: CPT

## 2019-06-06 RX ORDER — HYDROCODONE BITARTRATE AND ACETAMINOPHEN 5; 325 MG/1; MG/1
1 TABLET ORAL EVERY 6 HOURS PRN
Qty: 6 TABLET | Refills: 0 | Status: SHIPPED | OUTPATIENT
Start: 2019-06-07 | End: 2019-06-06 | Stop reason: SDUPTHER

## 2019-06-06 RX ORDER — HYDROCODONE BITARTRATE AND ACETAMINOPHEN 5; 325 MG/1; MG/1
1 TABLET ORAL EVERY 6 HOURS PRN
Qty: 10 TABLET | Refills: 0 | Status: SHIPPED | OUTPATIENT
Start: 2019-06-07 | End: 2019-06-10

## 2019-06-06 RX ORDER — HYDROCODONE BITARTRATE AND ACETAMINOPHEN 10; 325 MG/1; MG/1
1 TABLET ORAL ONCE
Status: COMPLETED | OUTPATIENT
Start: 2019-06-06 | End: 2019-06-06

## 2019-06-06 RX ADMIN — HYDROCODONE BITARTRATE AND ACETAMINOPHEN 1 TABLET: 10; 325 TABLET ORAL at 07:46

## 2019-06-06 ASSESSMENT — ENCOUNTER SYMPTOMS
BACK PAIN: 0
EYE PAIN: 0
DIARRHEA: 0
VOMITING: 0
ABDOMINAL PAIN: 0
COUGH: 0
NAUSEA: 0
SHORTNESS OF BREATH: 0

## 2019-06-06 ASSESSMENT — PAIN DESCRIPTION - ORIENTATION
ORIENTATION: RIGHT
ORIENTATION: RIGHT

## 2019-06-06 ASSESSMENT — PAIN SCALES - GENERAL
PAINLEVEL_OUTOF10: 4
PAINLEVEL_OUTOF10: 8
PAINLEVEL_OUTOF10: 4
PAINLEVEL_OUTOF10: 9
PAINLEVEL_OUTOF10: 8

## 2019-06-06 ASSESSMENT — PAIN DESCRIPTION - ONSET: ONSET: ON-GOING

## 2019-06-06 ASSESSMENT — PAIN DESCRIPTION - PAIN TYPE
TYPE: ACUTE PAIN
TYPE: ACUTE PAIN

## 2019-06-06 ASSESSMENT — PAIN DESCRIPTION - PROGRESSION: CLINICAL_PROGRESSION: NOT CHANGED

## 2019-06-06 ASSESSMENT — PAIN DESCRIPTION - LOCATION
LOCATION: ARM
LOCATION: ARM

## 2019-06-06 ASSESSMENT — PAIN - FUNCTIONAL ASSESSMENT: PAIN_FUNCTIONAL_ASSESSMENT: ACTIVITIES ARE NOT PREVENTED

## 2019-06-06 ASSESSMENT — PAIN DESCRIPTION - FREQUENCY: FREQUENCY: CONTINUOUS

## 2019-06-06 ASSESSMENT — PAIN DESCRIPTION - DESCRIPTORS: DESCRIPTORS: ACHING

## 2019-06-06 NOTE — ED PROVIDER NOTES
**EVALUATED BY ADVANCED PRACTICE PROVIDER**        [address]      Pt Name: Corrina Benjamin  APF:[id number]  [place] 1975  Date of evaluation: 6/6/2019  Provider: FREDI Romo      Chief Complaint:    Chief Complaint   Patient presents with    Arm Swelling     Right arm/hand swelling for one week. Nursing Notes, Past Medical Hx, Past Surgical Hx, Social Hx, Allergies, and Family Hx were all reviewed and agreed with or any disagreements were addressed in the HPI.    HPI:  (Location, Duration, Timing, Severity,Quality, Assoc Sx, Context, Modifying factors)  This is a  37 y.o. male with past medical history of CHF, gout, HTN, OIA, who presents to the ED with complaints of right hand and wrist pain and swelling. Denies similar previous episodes int he past; usually gout is in ankles and knees. Admits not taking Uloric and colchicine regularly. Onset 2 weeks. Denies injury. Reports 9/10 pain. Seems to be worse in first and second metacarpals extending into radial aspect of wrist. Denies fever. Has tried taking Prednsione without relief. Has tried taking Norco which does give some temporary relief of pain. The patient denies fever or chills, chest pain, shortness of breath, abdominal pain, nausea, vomiting, diarrhea. No recent travel, exposure to sick contacts, or recent antibiotics. No other acute concerns, associated symptoms or modifying factors. I reviewed records via eelusion. Seen at  2 days ago. Per their note: \"The pt recently traveled to [place] where he went to the ED and received a tramadol and seroids. During the evaluation in [place] he had a right upper extremity duplex sonography which failed to show deep vein thrombosis. He had radiographs which showed calcifications in hand suggestive of gout his uric acid was elevated.  The patient stated the interim 2 weeks in [place] and only recently returned to [facility]. \"  Per d/c information he was started on Prednisone and instructed to follow up with [facility]. He was prescribed a short supply of [place]        PastMedical/Surgical History:      Diagnosis Date    Arthritis     CHF (congestive heart failure) (HCC)     Gout     HTN (hypertension)     Hypertension      History reviewed. No pertinent surgical history. Medications:  Current Discharge Medication List      CONTINUE these medications which have NOT CHANGED    Details   colchicine (COLCRYS) 0.6 MG tablet TAKE 1 TABLET BY MOUTH TWICE A DAY  Qty: 60 tablet, Refills: 3    Associated Diagnoses: Idiopathic chronic gout of multiple sites without tophus      naproxen (NAPROSYN) 500 MG tablet TAKE 1 TABLET BY MOUTH TWICE A DAY WITH MEALS  Qty: 60 tablet, Refills: 3    Associated Diagnoses: Primary osteoarthritis of both knees      Lesinurad (ZURAMPIC) 200 MG TABS Take 1 tablet by mouth daily  Qty: 30 tablet, Refills: 5    Associated Diagnoses: Idiopathic chronic gout of multiple sites without tophus      sacubitril-valsartan (ENTRESTO) 49-51 MG per tablet Take 1 tablet by mouth      predniSONE (DELTASONE) 10 MG tablet Take 4  tabs daily for 4 days, 3 tabs daily for 4 days, 2 tabs daily for 4 days,1 tab daily for 4 days and then stop  Qty: 40 tablet, Refills: 0    Associated Diagnoses: Idiopathic chronic gout of multiple sites without tophus      Febuxostat 80 MG TABS Take 80 mg by mouth daily  Qty: 30 tablet, Refills: 5    Associated Diagnoses: Idiopathic chronic gout of multiple sites without tophus      hydrALAZINE (APRESOLINE) 50 MG tablet Take 50 mg by mouth 2 times daily      metoprolol (TOPROL-XL) 100 MG XL tablet Take 100 mg by mouth daily      furosemide (LASIX) 20 MG tablet Take 40 mg by mouth daily                Review of Systems:  Review of Systems   Constitutional: Negative for chills, fatigue and fever. Eyes: Negative for pain. Respiratory: Negative for cough and shortness of breath. Cardiovascular: Negative for chest pain. Gastrointestinal: Negative for abdominal pain, diarrhea, nausea and vomiting. Genitourinary: Negative for dysuria. Musculoskeletal: Positive for arthralgias. Negative for back pain, neck pain and neck stiffness. Skin: Negative for rash. Neurological: Negative for dizziness and headaches. Psychiatric/Behavioral: Negative for confusion. Positives and Pertinent negatives as per HPI. Except as noted above in the ROS, problem specific ROS was completed and is negative. Physical Exam:  Physical Exam   Constitutional: He is oriented to person, place, and time. He appears well-developed. No distress. HENT:   Head: Normocephalic and atraumatic. Eyes: Right eye exhibits no discharge. Left eye exhibits no discharge. Neck: Normal range of motion. Neck supple. Cardiovascular: Intact distal pulses. Pulmonary/Chest: No stridor. No respiratory distress. Musculoskeletal: Normal range of motion. R hand edema. Which is seemingly localized to the right MCP and CMC joints. The patient has swelling mostly on the radial side. Tender mostly over first and second metacarpals. There is no overlying erythema. Intact peripheral pulses. No lymphangitic streaking. No breaks in the skin. Brisk cap refill. Skin warm well perfused. Neurological: He is alert and oriented to person, place, and time. No gross facial drooping. Moves all 4 extremities spontaneously. Skin: Skin is warm and dry. He is not diaphoretic. No pallor. Psychiatric: He has a normal mood and affect. His behavior is normal.   Nursing note and vitals reviewed.       MEDICAL DECISION MAKING    Vitals:    Vitals:    06/06/19 0654 06/06/19 0746 06/06/19 0801 06/06/19 0832   BP: 102/74 (!) 138/99 (!) 141/92 (!) 140/98   Pulse: 96 88  86   Resp: 16 16  16   Temp: 97.3 °F (36.3 °C) 97.7 °F (36.5 °C)  97.7 °F (36.5 °C)   TempSrc: Oral Oral     SpO2: 96% 97% 93% 96%   Weight: (!) 413 lb 2.3 oz (187.4 kg) LABS:  Labs Reviewed   CBC WITH AUTO DIFFERENTIAL - Abnormal; Notable for the following components:       Result Value    WBC 19.1 (*)     Neutrophils # 16.5 (*)     All other components within normal limits    Narrative:     Performed at:  [address] [address]   Phone [phone]   BASIC METABOLIC PANEL W/ REFLEX TO MG FOR LOW K - Abnormal; Notable for the following components:    Glucose 137 (*)     All other components within normal limits    Narrative:     Performed at:  [address] [address]   Phone [phone]   URIC ACID - Abnormal; Notable for the following components:    Uric Acid, Serum 7.9 (*)     All other components within normal limits    Narrative:     [address]formed at:  [address] [address]   Phone [phone]   LACTIC ACID, PLASMA    Narrative:     Performed at:  [address] [address]   Phone [phone] of labs reviewed and werenegative at this time or not returned at the time of this note. RADIOLOGY:   Non-plain film images such as CT, Ultrasound and MRI are read by the radiologist. Merlinda Cyphers, PA have directly visualized the radiologic plain film image(s) with the below findings:        Interpretation per the Radiologist below, if available at the time of thisnote:    XR CHEST STANDARD (2 VW)   Final Result   1. Cardiomegaly with moderate pulmonary vascular congestion. XR HAND RIGHT (MIN 3 VIEWS)   Final Result   1. No acute osseous abnormality. XR WRIST RIGHT (MIN 3 VIEWS)   Final Result   1. No acute osseous abnormality. No results found.       MEDICAL DECISION MAKING / ED COURSE:      PROCEDURES:   Procedures    None    Patient was given:  Medications HYDROcodone-acetaminophen (NORCO)  MG per tablet 1 tablet (1 tablet Oral Given 6/6/19 4210)       Differential diagnosis includes but not limited to fracture, dislocation, vascular injury, neurologic injury. Patient seen and examined today for right hand swelling, pain, wrist pain. See HPI for patient presentation. Patient is in no acute distress, nontoxic, afebrile with unremarkable vital signs. NV intact. He has a h/o gout. Overall symptoms present x 2 weeks. Seen at  2 days ago with unremarkbale work up. Plain films reviewed and interpreted: negative. He has elevated WBC 19.1 but lactic is negative, afebrile with unremarkable VS.  Suspected leukocytosis d/t prednisone use. His uric acid is elevated at 7.9. Suspected gouty arthritis. He is not compliant with Uloric and colchicine. Continue prednisone. Needs follow up with his rheumatologist; per their notes they were going to add Zurampic or Krystexxa infusions for his persistently elevated uric acid. The patient states he is not happy with his rheumatologist and is requesting referral to another dr.  I did advise   At this time I believe patient's presentation does not warrant further workup with labs or imaging in the emergency department and is stable for discharge home. Discussed s/s to return if fever, redness, warmth, streaking up arm . I discussed with Angelo Donald and/or family the exam results, diagnosis, care, prognosis, reasons to return and the importance of follow up. Patient will be discharged with instructions to follow up with the primary care physician for reevaluation in the next few days, and to return to the emergency department for any worsening symptoms or further concerns. They verbalized understanding and were discharged in stable condition. Angelo Donald is well appearing, non-toxic, and afebrile at the time of discharge.   I estimate there is LOW risk for FRACTURE, COMPARTMENT SYNDROME, DEEP VENOUS THROMBOSIS, SEPTIC ARTHRITIS, TENDON OR NEUROVASCULAR INJURY, thus I consider the discharge disposition reasonable. The patient tolerated their visit well. I evaluated the patient. The physician was available for consultation as needed. The patient and / or the family were informed of the results of anytests, a time was given to answer questions, a plan was proposed and they agreed with plan. CLINICAL IMPRESSION:  1. Swelling of right hand    2. Arthritis, gouty        DISPOSITION Decision To Discharge 06/06/2019 08:27:10 AM      PATIENT REFERRED TO:  Fercho Rainey MD  [address]  [address] [facility] [place] [address]  [phone]    Schedule an appointment as soon as possible for a visit   ED follow up with your rheumatologist    [facility] Emergency Department  [address] [place] [address]  [phone]    If symptoms worsen    [place]  [facility]  [address] [address] [phone]    As needed for rheumatology as requested      DISCHARGE MEDICATIONS:  Current Discharge Medication List      START taking these medications    Details   HYDROcodone-acetaminophen (NORCO) 5-325 MG per tablet Take 1 tablet by mouth every 6 hours as needed for Pain for up to 3 days.   Qty: 10 tablet, Refills: 0    Associated Diagnoses: Arthritis, gouty             DISCONTINUED MEDICATIONS:  Current Discharge Medication List                 (Please note the MDM and HPI sections of this note were completed with a voice recognition program.  Efforts weremade to edit the dictations but occasionally words are mis-transcribed.)    Electronically signed, Fernando Beebe, [address],           Megha Licea  06/06/19 5572

## 2019-06-13 ENCOUNTER — OFFICE VISIT (OUTPATIENT)
Dept: INTERNAL MEDICINE CLINIC | Age: 44
End: 2019-06-13
Payer: MEDICAID

## 2019-06-13 VITALS
BODY MASS INDEX: 40.43 KG/M2 | SYSTOLIC BLOOD PRESSURE: 136 MMHG | DIASTOLIC BLOOD PRESSURE: 88 MMHG | HEIGHT: 74 IN | HEART RATE: 72 BPM | WEIGHT: 315 LBS

## 2019-06-13 DIAGNOSIS — Z13.1 DIABETES MELLITUS SCREENING: ICD-10-CM

## 2019-06-13 DIAGNOSIS — N52.9 ERECTILE DYSFUNCTION, UNSPECIFIED ERECTILE DYSFUNCTION TYPE: ICD-10-CM

## 2019-06-13 DIAGNOSIS — M1A.09X0 IDIOPATHIC CHRONIC GOUT OF MULTIPLE SITES WITHOUT TOPHUS: ICD-10-CM

## 2019-06-13 DIAGNOSIS — Z76.89 ENCOUNTER TO ESTABLISH CARE: Primary | ICD-10-CM

## 2019-06-13 DIAGNOSIS — Z13.220 SCREENING CHOLESTEROL LEVEL: ICD-10-CM

## 2019-06-13 DIAGNOSIS — M17.0 PRIMARY OSTEOARTHRITIS OF BOTH KNEES: ICD-10-CM

## 2019-06-13 PROCEDURE — G8427 DOCREV CUR MEDS BY ELIG CLIN: HCPCS | Performed by: NURSE PRACTITIONER

## 2019-06-13 PROCEDURE — G8417 CALC BMI ABV UP PARAM F/U: HCPCS | Performed by: NURSE PRACTITIONER

## 2019-06-13 PROCEDURE — 4004F PT TOBACCO SCREEN RCVD TLK: CPT | Performed by: NURSE PRACTITIONER

## 2019-06-13 PROCEDURE — 99203 OFFICE O/P NEW LOW 30 MIN: CPT | Performed by: NURSE PRACTITIONER

## 2019-06-13 RX ORDER — SILDENAFIL 50 MG/1
50 TABLET, FILM COATED ORAL PRN
Qty: 30 TABLET | Refills: 2 | Status: SHIPPED | OUTPATIENT
Start: 2019-06-13 | End: 2019-06-13 | Stop reason: SDUPTHER

## 2019-06-13 RX ORDER — SILDENAFIL 50 MG/1
50 TABLET, FILM COATED ORAL PRN
Qty: 30 TABLET | Refills: 2 | Status: SHIPPED | OUTPATIENT
Start: 2019-06-13

## 2019-06-13 ASSESSMENT — PATIENT HEALTH QUESTIONNAIRE - PHQ9
1. LITTLE INTEREST OR PLEASURE IN DOING THINGS: 0
2. FEELING DOWN, DEPRESSED OR HOPELESS: 0
SUM OF ALL RESPONSES TO PHQ QUESTIONS 1-9: 0
SUM OF ALL RESPONSES TO PHQ QUESTIONS 1-9: 0
SUM OF ALL RESPONSES TO PHQ9 QUESTIONS 1 & 2: 0

## 2019-06-13 NOTE — PROGRESS NOTES
SUBJECTIVE:    Patient ID: Hiren Quach is a 37 y.o. male. CC: New patient appointment    HPI: The patient presents to the office to establish with a new primary care provider. Previous PCP was Dr. Ana Lilia Vicente. He has history of CHF, unsure systolic vs diastolic  He is a patient of  cardiology, Dr. Mayra Faria. He has history of hypertension which is treated by cardiology. He has history of gout and arthritis. He has been seeing Dr. Savanna Mayo here at the office but unhappy with his gout progress. He is requesting a new referral to rheumatology. He reports erectile dysfunction. He reports he was cleared to take Viagra by his cardiologist but they do not prescribe this and deferred to primary care. Past Medical History:   Diagnosis Date    Arthritis     CHF (congestive heart failure) (HCC)     Gout     HTN (hypertension)     Hypertension         History reviewed. No pertinent surgical history.     Family History   Problem Relation Age of Onset    Arthritis Mother     Diabetes Mother     Heart Disease Mother     Heart Disease Father     Arthritis Sister     Heart Disease Sister     Diabetes Sister     Heart Disease Maternal Grandmother     Diabetes Maternal Grandmother     Heart Attack Maternal Grandmother     Arthritis Maternal Grandmother     Heart Disease Paternal Grandfather     Heart Attack Paternal Grandfather     Arthritis Paternal Grandfather        Social History     Socioeconomic History    Marital status:      Spouse name: Not on file    Number of children: Not on file    Years of education: Not on file    Highest education level: Not on file   Occupational History    Not on file   Social Needs    Financial resource strain: Not on file    Food insecurity:     Worry: Not on file     Inability: Not on file    Transportation needs:     Medical: Not on file     Non-medical: Not on file   Tobacco Use    Smoking status: Current Every Day Smoker     Packs/day: 0.10

## 2019-06-14 ASSESSMENT — ENCOUNTER SYMPTOMS
GASTROINTESTINAL NEGATIVE: 1
RESPIRATORY NEGATIVE: 1
